# Patient Record
Sex: MALE | Race: WHITE
[De-identification: names, ages, dates, MRNs, and addresses within clinical notes are randomized per-mention and may not be internally consistent; named-entity substitution may affect disease eponyms.]

---

## 2018-05-25 ENCOUNTER — HOSPITAL ENCOUNTER (OUTPATIENT)
Dept: HOSPITAL 47 - LABWHC1 | Age: 58
Discharge: HOME | End: 2018-05-25
Attending: PODIATRIST
Payer: COMMERCIAL

## 2018-05-25 DIAGNOSIS — M10.9: ICD-10-CM

## 2018-05-25 DIAGNOSIS — M86.9: Primary | ICD-10-CM

## 2018-05-25 LAB
BASOPHILS # BLD AUTO: 0 K/UL (ref 0–0.2)
BASOPHILS NFR BLD AUTO: 1 %
EOSINOPHIL # BLD AUTO: 0.2 K/UL (ref 0–0.7)
EOSINOPHIL NFR BLD AUTO: 3 %
ERYTHROCYTE [DISTWIDTH] IN BLOOD BY AUTOMATED COUNT: 4.43 M/UL (ref 4.3–5.9)
ERYTHROCYTE [DISTWIDTH] IN BLOOD: 12.7 % (ref 11.5–15.5)
HCT VFR BLD AUTO: 39.5 % (ref 39–53)
HGB BLD-MCNC: 13.2 GM/DL (ref 13–17.5)
LYMPHOCYTES # SPEC AUTO: 1.2 K/UL (ref 1–4.8)
LYMPHOCYTES NFR SPEC AUTO: 21 %
MCH RBC QN AUTO: 29.8 PG (ref 25–35)
MCHC RBC AUTO-ENTMCNC: 33.4 G/DL (ref 31–37)
MCV RBC AUTO: 89.2 FL (ref 80–100)
MONOCYTES # BLD AUTO: 0.4 K/UL (ref 0–1)
MONOCYTES NFR BLD AUTO: 6 %
NEUTROPHILS # BLD AUTO: 3.9 K/UL (ref 1.3–7.7)
NEUTROPHILS NFR BLD AUTO: 68 %
PLATELET # BLD AUTO: 375 K/UL (ref 150–450)
WBC # BLD AUTO: 5.8 K/UL (ref 3.8–10.6)

## 2018-05-25 PROCEDURE — 85652 RBC SED RATE AUTOMATED: CPT

## 2018-05-25 PROCEDURE — 86431 RHEUMATOID FACTOR QUANT: CPT

## 2018-05-25 PROCEDURE — 85025 COMPLETE CBC W/AUTO DIFF WBC: CPT

## 2018-05-25 PROCEDURE — 36415 COLL VENOUS BLD VENIPUNCTURE: CPT

## 2018-05-25 PROCEDURE — 84550 ASSAY OF BLOOD/URIC ACID: CPT

## 2020-07-23 ENCOUNTER — HOSPITAL ENCOUNTER (OUTPATIENT)
Dept: HOSPITAL 47 - RADUSWWP | Age: 60
Discharge: HOME | End: 2020-07-23
Attending: INTERNAL MEDICINE
Payer: COMMERCIAL

## 2020-07-23 DIAGNOSIS — R93.6: Primary | ICD-10-CM
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
## 2023-04-25 ENCOUNTER — HOSPITAL ENCOUNTER (OUTPATIENT)
Dept: HOSPITAL 47 - SLEEP | Age: 63
End: 2023-04-25
Attending: INTERNAL MEDICINE
Payer: COMMERCIAL

## 2023-04-25 DIAGNOSIS — Z99.89: ICD-10-CM

## 2023-04-25 DIAGNOSIS — Z86.16: ICD-10-CM

## 2023-04-25 DIAGNOSIS — E78.5: ICD-10-CM

## 2023-04-25 DIAGNOSIS — G47.33: Primary | ICD-10-CM

## 2023-04-25 DIAGNOSIS — R06.83: ICD-10-CM

## 2023-04-25 DIAGNOSIS — R40.0: ICD-10-CM

## 2023-04-25 DIAGNOSIS — I10: ICD-10-CM

## 2023-04-25 DIAGNOSIS — J30.9: ICD-10-CM

## 2023-04-25 PROCEDURE — 99211 OFF/OP EST MAY X REQ PHY/QHP: CPT
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
## 2023-06-04 ENCOUNTER — HOSPITAL ENCOUNTER (OUTPATIENT)
Dept: HOSPITAL 47 - 3 N SLEEP | Age: 63
LOS: 1 days | End: 2023-06-05
Attending: INTERNAL MEDICINE
Payer: COMMERCIAL

## 2023-06-04 DIAGNOSIS — G47.33: Primary | ICD-10-CM

## 2023-06-04 PROCEDURE — 95810 POLYSOM 6/> YRS 4/> PARAM: CPT

## 2023-06-19 NOTE — P.PCN
Date of Procedure: 06/04/23


Operative Findings: 


Polysomnography report





Date of service is 06/04/2023





Pertinent history





Hypersomnia with an [place] score of 11, consider the possibility of obstructive

sleep apnea.


Loud snoring and witnessed apneas


Mallampati class IV with crowding of the posterior pharynx


Body mass index of 33.2


Hypertension


Hyperlipidemia


ALLERGIC rhinitis


Previous history of Covid 19 infection.  





Technical description


The patient was studied using a standard complex polysomnography protocol that 

included recording of the 2 EKG, Central, occipital and frontal EEG, right and 

left outer canthus EOG, submental EMG, right and left anterior tibialis EMG, 

respiratory airflow by thermocouple and or pressure/flow transducer, respiratory

efforts by abdominal and thoracic PVDF belts, oxygen saturation by cable 

oximetry.  Position by observation synchronized the PSG.  4 children 12 and 

nontender and select patients, ETCO2 may be added to the recording. Equipment 

used: [employer].





[place] score was 13 out of 24





Sleep architecture


The total time in bed was 476.5 minutes.  The total sleep time was 389.5 minutes

with a sleep efficiency of 81.7%.  The patient's sleep latency was 10 minutes.  

There was a total of 20.5% REM sleep and REM latency was 131 minutes.  The sleep

architecture was characterized by 5.4% stage I, 74.1% stage II, 0% initially and

20.5% REM sleep.





Sleep continuity summary 


the arousal index was 0.2 per hour.





Respiratory analysis


The respiratory analysis showed a total of 36 obstructive events of which 5 were

obstructive, 1 was mixed and there was a total of 30 obstructive hypopneas (mean

taking the AASM rule 1a of at least 3% saturation on an arousal) and there were 

28 obstructive hypopneas meeting the AASM rule 1b with at least 4% saturation.  

No central apneas were encountered.  The apnea hypoxemia index recorded was 5.5.





Oxygenation analysis 


the lowest pulse ox was 88%.  Based on pulse ox was 97% while awake.  This 

patient spent approximately 90 minutes of the sleep time below pulse ox of 89%. 

Minimum pulse ox during REM was 88%.





Periodic limb movement activity


The total number of periodic limb movement activity was 93 with an index of 

14.3.  There were only 15 periodic limb movement with arousals with an index of 

13.





Cardiac summary


Average heart rate was 49, minimum heart rate was 46 and a maximum of 52





Assessment





Mild obstructive sleep apnea with an AHI of 5.5 with limited nocturnal oxygen 

saturations


Chronic hypersomnia


Hypertension


Hyperlipidemia





Plan





This is a case of mild obstructive sleep apnea.  The patient's disease severity 

is minimal at this point in time.  The patient remains obviously qualify for 

CPAP therapy.  However elected given conservative approaches of losing weight 

and optimizing sleep hygiene measures and monitoring the situation and symptoms 

accordingly.  I'm going to have discussion with the patient and follow him up 

closely.  CPAP will be operated the patient's condition becomes more 

symptomatic.  Otherwise, conservative measures should be implemented.  

Alternatively, he can also use a form compliance for his mild obstructive sleep 

apnea.